# Patient Record
Sex: FEMALE | Race: WHITE | URBAN - METROPOLITAN AREA
[De-identification: names, ages, dates, MRNs, and addresses within clinical notes are randomized per-mention and may not be internally consistent; named-entity substitution may affect disease eponyms.]

---

## 2019-10-17 ENCOUNTER — OFFICE VISIT (OUTPATIENT)
Dept: FAMILY MEDICINE CLINIC | Facility: CLINIC | Age: 24
End: 2019-10-17
Payer: COMMERCIAL

## 2019-10-17 VITALS
SYSTOLIC BLOOD PRESSURE: 90 MMHG | TEMPERATURE: 98 F | HEIGHT: 62 IN | BODY MASS INDEX: 23.92 KG/M2 | HEART RATE: 90 BPM | RESPIRATION RATE: 20 BRPM | WEIGHT: 130 LBS | DIASTOLIC BLOOD PRESSURE: 60 MMHG | OXYGEN SATURATION: 98 %

## 2019-10-17 DIAGNOSIS — J02.9 PHARYNGITIS, UNSPECIFIED ETIOLOGY: Primary | ICD-10-CM

## 2019-10-17 PROCEDURE — 99203 OFFICE O/P NEW LOW 30 MIN: CPT | Performed by: NURSE PRACTITIONER

## 2019-10-17 PROCEDURE — 87880 STREP A ASSAY W/OPTIC: CPT | Performed by: NURSE PRACTITIONER

## 2019-10-17 NOTE — PROGRESS NOTES
CHIEF COMPLAINT:   Patient presents with:  Sore Throat        HPI:   Margarita Chaney is a 22year old female presents to clinic with complaint of sore throat. Patient has had 4 days. Symptoms have been worsening since onset.   Patient reports following edema  LYMPH: No anterior cervical. No submandibular lymphadenopathy. No posterior cervical or occipital lymphadenopathy.     Recent Results (from the past 24 hour(s))   STREP A ASSAY W/OPTIC    Collection Time: 10/17/19  8:40 AM   Result Value Ref Range